# Patient Record
Sex: FEMALE | Race: WHITE
[De-identification: names, ages, dates, MRNs, and addresses within clinical notes are randomized per-mention and may not be internally consistent; named-entity substitution may affect disease eponyms.]

---

## 2021-10-06 ENCOUNTER — NEW PATIENT COMPREHENSIVE (OUTPATIENT)
Dept: URBAN - METROPOLITAN AREA CLINIC 48 | Facility: CLINIC | Age: 61
End: 2021-10-06

## 2021-10-06 DIAGNOSIS — H04.123: ICD-10-CM

## 2021-10-06 DIAGNOSIS — H43.813: ICD-10-CM

## 2021-10-06 DIAGNOSIS — H26.492: ICD-10-CM

## 2021-10-06 DIAGNOSIS — H26.491: ICD-10-CM

## 2021-10-06 DIAGNOSIS — H52.4: ICD-10-CM

## 2021-10-06 PROCEDURE — 92004 COMPRE OPH EXAM NEW PT 1/>: CPT

## 2021-10-06 PROCEDURE — 92134 CPTRZ OPH DX IMG PST SGM RTA: CPT

## 2021-10-06 PROCEDURE — 92015 DETERMINE REFRACTIVE STATE: CPT

## 2021-10-06 ASSESSMENT — VISUAL ACUITY
OU_SC: J3-2
OD_GLARE: 20/80
OU_SC: 20/30-1
OS_SC: 20/60-2
OS_PH: 20/30
OD_PH: 20/25
OD_SC: 20/40
OS_GLARE: 20/40

## 2021-10-06 ASSESSMENT — KERATOMETRY
OS_K2POWER_DIOPTERS: 40.50
OS_AXISANGLE2_DEGREES: 165
OS_AXISANGLE_DEGREES: 075
OD_AXISANGLE2_DEGREES: 105
OD_K1POWER_DIOPTERS: 40.50
OD_AXISANGLE_DEGREES: 015
OS_K1POWER_DIOPTERS: 41.25
OD_K2POWER_DIOPTERS: 39.75

## 2021-10-06 ASSESSMENT — TONOMETRY
OS_IOP_MMHG: 16
OD_IOP_MMHG: 16

## 2021-10-06 NOTE — PATIENT DISCUSSION
Advised patient to use preservative-free artificial tears QID OU, warm compresses BID OU, and lid scrubs BID OU.

## 2021-10-06 NOTE — PATIENT DISCUSSION
PCO ([id number]): Visually significant PCO present on exam today. Recommend YAG laser capsulotomy to improve vision and decrease glare symptoms. RBAs of procedure discussed. Patient agrees and wishes to proceed.

## 2021-10-25 ENCOUNTER — YAG CAPSULOTOMY OD (OUTPATIENT)
Dept: URBAN - METROPOLITAN AREA CLINIC 53 | Facility: CLINIC | Age: 61
End: 2021-10-25

## 2021-10-25 DIAGNOSIS — H26.491: ICD-10-CM

## 2021-10-25 PROCEDURE — 66821 AFTER CATARACT LASER SURGERY: CPT

## 2021-10-25 RX ORDER — PREDNISOLONE ACETATE 10 MG/ML: 1 SUSPENSION/ DROPS OPHTHALMIC TWICE A DAY

## 2021-10-25 ASSESSMENT — KERATOMETRY
OD_K2POWER_DIOPTERS: 39.75
OS_K2POWER_DIOPTERS: 40.50
OD_AXISANGLE2_DEGREES: 105
OS_AXISANGLE2_DEGREES: 165
OD_AXISANGLE_DEGREES: 015
OS_AXISANGLE_DEGREES: 075
OD_K1POWER_DIOPTERS: 40.50
OS_K1POWER_DIOPTERS: 41.25

## 2021-10-25 ASSESSMENT — VISUAL ACUITY
OD_SC: 20/30
OS_SC: 20/30

## 2021-10-25 ASSESSMENT — TONOMETRY
OD_IOP_MMHG: 14
OS_IOP_MMHG: 14

## 2021-10-25 NOTE — PROCEDURE NOTE: CLINICAL
PROCEDURE NOTE: YAG Capsulotomy OD. Diagnosis: Posterior Capsular Opacification (PCO). Prep: Mydriacil 1% and Phenylephrine 2.5%. Prior to treatment, the risks/benefits/alternatives were discussed. The patient wished to proceed with procedure. Consent was signed. Proparacaine and brominidine were placed into the operative eye after the eye was dilated. Power = 3.6mJ. Number of pulses = 98. Patient tolerated procedure well and there were no complications. Post Laser instructions given. Mauricio Butler

## 2021-11-24 ENCOUNTER — POST-OP LASER (OUTPATIENT)
Dept: URBAN - METROPOLITAN AREA CLINIC 48 | Facility: CLINIC | Age: 61
End: 2021-11-24

## 2021-11-24 DIAGNOSIS — Z98.890: ICD-10-CM

## 2021-11-24 PROCEDURE — 99024 POSTOP FOLLOW-UP VISIT: CPT

## 2021-11-24 ASSESSMENT — KERATOMETRY
OS_K2POWER_DIOPTERS: 40.50
OS_AXISANGLE2_DEGREES: 165
OS_K1POWER_DIOPTERS: 41.25
OD_K2POWER_DIOPTERS: 39.75
OD_AXISANGLE_DEGREES: 015
OD_K1POWER_DIOPTERS: 40.50
OS_AXISANGLE_DEGREES: 075
OD_AXISANGLE2_DEGREES: 105

## 2021-11-24 ASSESSMENT — VISUAL ACUITY
OD_CC: 20/25-1
OD_CC: J1+
OS_SC: 20/25-1
OU_SC: 20/25-1
OU_CC: 20/25+1
OD_SC: 20/30
OS_CC: 20/25+1
OU_CC: J1+
OS_CC: J1+

## 2021-11-24 ASSESSMENT — TONOMETRY
OS_IOP_MMHG: 18
OD_IOP_MMHG: 17

## 2022-05-13 ENCOUNTER — ESTABLISHED PATIENT (OUTPATIENT)
Dept: URBAN - METROPOLITAN AREA CLINIC 53 | Facility: CLINIC | Age: 62
End: 2022-05-13

## 2022-05-13 DIAGNOSIS — B00.59: ICD-10-CM

## 2022-05-13 PROCEDURE — 92012 INTRM OPH EXAM EST PATIENT: CPT

## 2022-05-13 RX ORDER — ERYTHROMYCIN 5 MG/G
OINTMENT OPHTHALMIC
Start: 2022-05-13

## 2022-05-13 ASSESSMENT — TONOMETRY
OD_IOP_MMHG: 17
OS_IOP_MMHG: 17

## 2022-05-13 ASSESSMENT — VISUAL ACUITY
OS_SC: 20/25-1
OD_SC: 20/25-1
OU_SC: 20/25-1

## 2022-05-13 NOTE — PATIENT DISCUSSION
Patient to continue her Valtrex as prescribed. Will start patient on Erythromycin to be applied to LLL four times a day. Will allow one refill as patient is going out of town. Advised patient to clean things after she touches them incase this is a recurrence of . Will see patient back in 1-2 weeks once she returns from her trip.

## 2022-12-01 ENCOUNTER — ESTABLISHED PATIENT (OUTPATIENT)
Dept: URBAN - METROPOLITAN AREA CLINIC 48 | Facility: LOCATION | Age: 62
End: 2022-12-01

## 2022-12-01 PROCEDURE — 92012 INTRM OPH EXAM EST PATIENT: CPT

## 2022-12-01 ASSESSMENT — TONOMETRY
OS_IOP_MMHG: 15
OD_IOP_MMHG: 13

## 2022-12-01 ASSESSMENT — VISUAL ACUITY
OS_SC: 20/30+2
OD_SC: 20/25-1

## 2022-12-01 NOTE — PATIENT DISCUSSION
Patient to start Tobradex QID OS x 7 days. Recommend cold compresses and PFATs prn OU. RTC in 1 week for f/u.

## 2022-12-07 ENCOUNTER — FOLLOW UP (OUTPATIENT)
Dept: URBAN - METROPOLITAN AREA CLINIC 48 | Facility: LOCATION | Age: 62
End: 2022-12-07

## 2022-12-07 PROCEDURE — 92012 INTRM OPH EXAM EST PATIENT: CPT

## 2022-12-07 ASSESSMENT — VISUAL ACUITY
OD_SC: 20/25
OU_SC: 20/25
OS_SC: 20/25

## 2022-12-07 ASSESSMENT — TONOMETRY
OS_IOP_MMHG: 16
OD_IOP_MMHG: 15

## 2022-12-12 ENCOUNTER — COMPREHENSIVE EXAM (OUTPATIENT)
Dept: URBAN - METROPOLITAN AREA CLINIC 48 | Facility: LOCATION | Age: 62
End: 2022-12-12

## 2022-12-12 PROCEDURE — 92014 COMPRE OPH EXAM EST PT 1/>: CPT

## 2022-12-12 PROCEDURE — 92134 CPTRZ OPH DX IMG PST SGM RTA: CPT

## 2022-12-12 PROCEDURE — 92015 DETERMINE REFRACTIVE STATE: CPT

## 2022-12-12 ASSESSMENT — TONOMETRY
OD_IOP_MMHG: 15
OS_IOP_MMHG: 15

## 2022-12-12 ASSESSMENT — VISUAL ACUITY
OS_SC: 20/25
OD_SC: 20/25
OS_GLARE: 20/25
OU_SC: J2
OS_GLARE: 20/25

## 2024-01-09 ENCOUNTER — COMPREHENSIVE EXAM (OUTPATIENT)
Dept: URBAN - METROPOLITAN AREA CLINIC 48 | Facility: LOCATION | Age: 64
End: 2024-01-09

## 2024-01-09 DIAGNOSIS — H35.342: ICD-10-CM

## 2024-01-09 DIAGNOSIS — H26.492: ICD-10-CM

## 2024-01-09 DIAGNOSIS — H04.123: ICD-10-CM

## 2024-01-09 DIAGNOSIS — H43.813: ICD-10-CM

## 2024-01-09 DIAGNOSIS — H35.371: ICD-10-CM

## 2024-01-09 PROCEDURE — 92014 COMPRE OPH EXAM EST PT 1/>: CPT

## 2024-01-09 PROCEDURE — 92134 CPTRZ OPH DX IMG PST SGM RTA: CPT

## 2024-01-09 ASSESSMENT — TONOMETRY
OD_IOP_MMHG: 14
OS_IOP_MMHG: 14

## 2024-01-09 ASSESSMENT — VISUAL ACUITY
OS_GLARE: 20/30
OS_GLARE: 20/25-2
OD_CC: 20/20
OU_CC: J1+@14IN
